# Patient Record
Sex: MALE | Race: WHITE | Employment: UNEMPLOYED | URBAN - METROPOLITAN AREA
[De-identification: names, ages, dates, MRNs, and addresses within clinical notes are randomized per-mention and may not be internally consistent; named-entity substitution may affect disease eponyms.]

---

## 2017-09-20 ENCOUNTER — HOSPITAL ENCOUNTER (EMERGENCY)
Age: 37
Discharge: HOME | End: 2017-09-20
Attending: STUDENT IN AN ORGANIZED HEALTH CARE EDUCATION/TRAINING PROGRAM
Payer: SELF-PAY

## 2017-09-20 ENCOUNTER — APPOINTMENT (OUTPATIENT)
Dept: GENERAL RADIOLOGY | Age: 37
End: 2017-09-20
Attending: STUDENT IN AN ORGANIZED HEALTH CARE EDUCATION/TRAINING PROGRAM
Payer: SELF-PAY

## 2017-09-20 ENCOUNTER — APPOINTMENT (OUTPATIENT)
Dept: CT IMAGING | Age: 37
End: 2017-09-20
Attending: STUDENT IN AN ORGANIZED HEALTH CARE EDUCATION/TRAINING PROGRAM
Payer: SELF-PAY

## 2017-09-20 VITALS
SYSTOLIC BLOOD PRESSURE: 120 MMHG | HEART RATE: 90 BPM | RESPIRATION RATE: 16 BRPM | OXYGEN SATURATION: 100 % | DIASTOLIC BLOOD PRESSURE: 78 MMHG | TEMPERATURE: 98.2 F
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
## 2017-09-20 PROCEDURE — 73030 X-RAY EXAM OF SHOULDER: CPT

## 2017-09-20 PROCEDURE — 96372 THER/PROPH/DIAG INJ SC/IM: CPT | Performed by: STUDENT IN AN ORGANIZED HEALTH CARE EDUCATION/TRAINING PROGRAM

## 2017-09-20 PROCEDURE — 70450 CT HEAD/BRAIN W/O DYE: CPT

## 2017-09-20 PROCEDURE — 72125 CT NECK SPINE W/O DYE: CPT

## 2017-09-20 PROCEDURE — 72100 X-RAY EXAM L-S SPINE 2/3 VWS: CPT

## 2017-09-20 PROCEDURE — 74011250636 HC RX REV CODE- 250/636

## 2017-09-20 PROCEDURE — 99283 EMERGENCY DEPT VISIT LOW MDM: CPT | Performed by: STUDENT IN AN ORGANIZED HEALTH CARE EDUCATION/TRAINING PROGRAM

## 2017-09-20 PROCEDURE — 73502 X-RAY EXAM HIP UNI 2-3 VIEWS: CPT

## 2017-09-20 PROCEDURE — 73110 X-RAY EXAM OF WRIST: CPT

## 2017-09-20 PROCEDURE — 74011250637 HC RX REV CODE- 250/637: Performed by: STUDENT IN AN ORGANIZED HEALTH CARE EDUCATION/TRAINING PROGRAM

## 2017-09-20 RX ORDER — IBUPROFEN 800 MG/1
800 TABLET ORAL
Qty: 20 TAB | Refills: 0 | Status: SHIPPED | OUTPATIENT
Start: 2017-09-20 | End: 2017-09-27

## 2017-09-20 RX ORDER — CYCLOBENZAPRINE HCL 5 MG
10 TABLET ORAL
Qty: 20 TAB | Refills: 2 | Status: SHIPPED | OUTPATIENT
Start: 2017-09-20

## 2017-09-20 RX ORDER — MORPHINE SULFATE 4 MG/ML
INJECTION, SOLUTION INTRAMUSCULAR; INTRAVENOUS
Status: DISCONTINUED
Start: 2017-09-20 | End: 2017-09-20 | Stop reason: HOSPADM

## 2017-09-20 RX ORDER — ONDANSETRON 4 MG/1
4 TABLET, ORALLY DISINTEGRATING ORAL
Status: COMPLETED | OUTPATIENT
Start: 2017-09-20 | End: 2017-09-20

## 2017-09-20 RX ORDER — MORPHINE SULFATE 4 MG/ML
4 INJECTION, SOLUTION INTRAMUSCULAR; INTRAVENOUS ONCE
Status: COMPLETED | OUTPATIENT
Start: 2017-09-20 | End: 2017-09-20

## 2017-09-20 RX ORDER — ONDANSETRON 4 MG/1
TABLET, ORALLY DISINTEGRATING ORAL
Status: DISCONTINUED
Start: 2017-09-20 | End: 2017-09-20 | Stop reason: HOSPADM

## 2017-09-20 RX ADMIN — ONDANSETRON 4 MG: 4 TABLET, ORALLY DISINTEGRATING ORAL at 04:54

## 2017-09-20 RX ADMIN — MORPHINE SULFATE 4 MG: 4 INJECTION, SOLUTION INTRAMUSCULAR; INTRAVENOUS at 04:54

## 2017-09-20 NOTE — ED NOTES
Patient triaged and treated during downtime--please see paper chart for information prior to this entry
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
## 2017-09-20 NOTE — ED NOTES
I have reviewed discharge instructions with the patient. The patient verbalized understanding. Patient left ED via Discharge Method: ambulatory to Home with his wife and child     Opportunity for questions and clarification provided. Patient given 2 scripts.

## 2017-09-20 NOTE — Clinical Note
Patient medication for pain as directed. Arrange follow up with your primary care physician or one of the facilities listed. Your Emergency Department Team appreciates your trust in us. It was unfortunate you had to seek care today, but we are glad mari carlos choose our Hospital for your Emergency Medical needs. We strive to provide excellent care. Our goal is to exceed your expectations. If you are not satisfied with your care today, please let the treatment team know before you leave the facility. We  would like the chance to rectify any problems now, if possible. Medicine is an imperfect science. Even in the most careful hands, a patients condition may change; or new unexpected symptoms may develop. Should your symptoms worsen or new problems d evelop, seek follow up care right away. If your symptoms are severe, such as severe pain, shortness of breath, unexplained fever, chest pain, severe abdominal pain, or any serious symptom, please return to THIS Emergency Department immediately. If  the Emergency Department team wants you to follow up with a specialist, his or her contact information will follow on the discharge instructions. Of course, your primary care provider (or Family Doctor) will be a great resource for your medical needs. We encourage you to seek follow up care in the near future. Even if you are to see a specialist, your family physician will want to be involved in your medical care. Should you not have a primary care physician; the Emergency Department will provide mari carlos with the name and contact information of an appropriate physician. It will be your responsibility to contact the on call physician's office and arrange an appointment. This physician might not participate in your insurance plan.   If not, other resour sudhakar are listed. Other resources include these local clinics: 
 
[facility] [phone] [address] [place] [phone] [address] [facility] Oneal/Lashawn 639-4  [employer] [phone] [facility] [phone] Center for [address] [place] [address] [phone] Medicaid Office [phone] [facility] Department [phone] 1020 [facility] [phone] [place] [phone] [facility] [address] [place] [phone] [facility]5 [facility] [phone] [address] [phone] [facility] CHEMICAL [facility] (/ [place] [address]) [phone] 1609 [facility] [phone] [place] [phone] 4500 [facility] [phone] 6501 [facility] [phone] Please remember, Emergency Department car e is no substitute for quality primary care. The Emergency Department cannot adequately manage chronic health problems such as hypertension, arthritis, chronic pain, lung disease, heart disease, and diabetes (to mention but a few). We cannot be expected  to screen for all possible illnesses in a single visit. It is vital, regardless of your condition, that you have regular, routine health care. Although we are always available to serve your emergency needs, we are but one building block in the wall of  your health and well being. But remember, if your problem does not improve as expected and you are not able to find follow up care, the Emergency Department is always available to re-evaluate your condition.   However, the Emergency Department physi cians will not routinely refill pain medications, sedatives, or anxiety medications. The Emergency Physicians will not be responsible for completion of disability statements, long term care insurance forms, or Family and Medical Leave Act (FMLA) documen ts. The Emergency Department does not routinely teressa extended work release statements. More specific instructions follow. In many cases, there are Internet links for further information. Again, thank you for your trust in us today. God Bless you  and get well soon.
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
## 2017-09-20 NOTE — LETTER
[address] [facility] EMERGENCY DEPT [address] [address] 
[phone] Work/School Note Date: 9/20/2017 To Whom It May concern: 
 
Tia Roman was seen and treated today in the emergency room by the following provider(s): 
Attending Provider: Hercalio Hernandez Camilla Garces may return to work on 09/21/17. Sincerely, Justa Treviño RN